# Patient Record
Sex: MALE | Race: WHITE | Employment: FULL TIME | URBAN - METROPOLITAN AREA
[De-identification: names, ages, dates, MRNs, and addresses within clinical notes are randomized per-mention and may not be internally consistent; named-entity substitution may affect disease eponyms.]

---

## 2018-10-08 ENCOUNTER — HOSPITAL ENCOUNTER (OUTPATIENT)
Age: 58
Discharge: HOME | End: 2018-10-08
Payer: COMMERCIAL

## 2018-10-08 PROCEDURE — 87305 ASPERGILLUS AG IA: CPT

## 2018-10-08 PROCEDURE — 87205 SMEAR GRAM STAIN: CPT

## 2018-10-08 PROCEDURE — 87385 HISTOPLASMA CAPSUL AG IA: CPT

## 2018-10-08 PROCEDURE — 87088 URINE BACTERIA CULTURE: CPT

## 2018-10-09 LAB — GRAM STAIN ORDERABLE: NORMAL

## 2018-10-10 LAB
ASPERGILLUS GALACTO AG: NEGATIVE
ASPERGILLUS GALACTO INDEX: 0.16
URINE CULTURE, ROUTINE: NORMAL

## 2018-10-15 LAB
Lab: NORMAL
REPORT: NORMAL
THIS TEST SENT TO: NORMAL

## 2018-10-29 ENCOUNTER — HOSPITAL ENCOUNTER (OUTPATIENT)
Age: 58
Discharge: HOME | End: 2018-10-29
Payer: COMMERCIAL

## 2018-10-29 ENCOUNTER — HOSPITAL ENCOUNTER (OUTPATIENT)
Dept: GENERAL RADIOLOGY | Age: 58
Discharge: HOME | End: 2018-10-31
Payer: COMMERCIAL

## 2018-10-29 DIAGNOSIS — R05.9 COUGH: ICD-10-CM

## 2018-10-29 PROCEDURE — 71046 X-RAY EXAM CHEST 2 VIEWS: CPT

## 2019-01-08 ENCOUNTER — HOSPITAL ENCOUNTER (OUTPATIENT)
Age: 59
Discharge: HOME | End: 2019-01-10

## 2019-01-08 PROCEDURE — 88342 IMHCHEM/IMCYTCHM 1ST ANTB: CPT

## 2019-01-08 PROCEDURE — 87081 CULTURE SCREEN ONLY: CPT

## 2019-01-08 PROCEDURE — 88305 TISSUE EXAM BY PATHOLOGIST: CPT

## 2019-01-09 LAB — CLOTEST: NORMAL

## 2019-07-30 ENCOUNTER — HOSPITAL ENCOUNTER (OUTPATIENT)
Age: 59
Discharge: HOME | End: 2019-08-01
Payer: COMMERCIAL

## 2019-07-30 LAB
CHOLESTEROL, TOTAL: 194 MG/DL (ref 0–199)
HDLC SERPL-MCNC: 47 MG/DL
LDL CHOLESTEROL CALCULATED: 103 MG/DL (ref 0–99)
TRIGL SERPL-MCNC: 222 MG/DL (ref 0–149)
VLDLC SERPL CALC-MCNC: 44 MG/DL

## 2019-07-30 PROCEDURE — 80061 LIPID PANEL: CPT

## 2020-05-06 ENCOUNTER — HOSPITAL ENCOUNTER (OUTPATIENT)
Age: 60
Discharge: HOME | End: 2020-05-08
Payer: COMMERCIAL

## 2020-05-06 PROCEDURE — U0003 INFECTIOUS AGENT DETECTION BY NUCLEIC ACID (DNA OR RNA); SEVERE ACUTE RESPIRATORY SYNDROME CORONAVIRUS 2 (SARS-COV-2) (CORONAVIRUS DISEASE [COVID-19]), AMPLIFIED PROBE TECHNIQUE, MAKING USE OF HIGH THROUGHPUT TECHNOLOGIES AS DESCRIBED BY CMS-2020-01-R: HCPCS

## 2020-05-08 LAB
SARS-COV-2: NOT DETECTED
SOURCE: NORMAL

## 2020-05-12 ENCOUNTER — HOSPITAL ENCOUNTER (OUTPATIENT)
Age: 60
Discharge: HOME | End: 2020-05-14
Payer: COMMERCIAL

## 2020-05-12 PROCEDURE — U0003 INFECTIOUS AGENT DETECTION BY NUCLEIC ACID (DNA OR RNA); SEVERE ACUTE RESPIRATORY SYNDROME CORONAVIRUS 2 (SARS-COV-2) (CORONAVIRUS DISEASE [COVID-19]), AMPLIFIED PROBE TECHNIQUE, MAKING USE OF HIGH THROUGHPUT TECHNOLOGIES AS DESCRIBED BY CMS-2020-01-R: HCPCS

## 2020-05-13 LAB
SARS-COV-2: NOT DETECTED
SOURCE: NORMAL

## 2020-09-10 ENCOUNTER — HOSPITAL ENCOUNTER (OUTPATIENT)
Age: 60
Discharge: HOME | End: 2020-09-12
Payer: COMMERCIAL

## 2020-09-10 PROCEDURE — 82306 VITAMIN D 25 HYDROXY: CPT

## 2020-09-11 LAB — VITAMIN D 25-HYDROXY: 79 NG/ML (ref 30–100)

## 2020-10-14 ENCOUNTER — OFFICE VISIT (OUTPATIENT)
Dept: VASCULAR SURGERY | Age: 60
End: 2020-10-14
Payer: COMMERCIAL

## 2020-10-14 VITALS
BODY MASS INDEX: 27.92 KG/M2 | RESPIRATION RATE: 16 BRPM | WEIGHT: 195 LBS | DIASTOLIC BLOOD PRESSURE: 82 MMHG | HEIGHT: 70 IN | SYSTOLIC BLOOD PRESSURE: 122 MMHG

## 2020-10-14 PROCEDURE — 99203 OFFICE O/P NEW LOW 30 MIN: CPT | Performed by: SURGERY

## 2020-10-14 NOTE — PROGRESS NOTES
Vascular Surgery Outpatient Consultation        Reason for Consult:    Chief Complaint   Patient presents with   [facility] Consultation     new pt. Raynauds       Requesting Physician:  No referring provider defined for this encounter. Chief Complaint   Patient presents with   [facility] Consultation     new pt. Raynauds       HISTORY OF PRESENT ILLNESS:                The patient is a 64 y.o. male who is referred for evaluation of bilateral upper extremity and bilateral lower extremity discoloration and cold sensation. He describes bilateral discoloration and some pain and discomfort with temperature changes. This is been happening intermittently. He describes when he is in the operating room he can get discoloration of the hands and they turn white in appearance. Today later then turned pink again. He has a history of multiple myeloma and is currently on maintenance therapy. He denies any chest pain, shortness of breath or discomfort. He otherwise is in his regular physical state of health. His only complaint is Raynaud's type of syndrome of the bilateral upper and bilateral lower extremities. All of which are exacerbated by temperature changes. He has no chronic rest pain of his upper or lower extremities. He has no ulcerations of his upper or lower extremities. Past Medical History:        Diagnosis Date    Compression fracture     History of blood transfusion     Hyperlipidemia     hisotry of    Multiple myeloma ([facility] [address]      Past Surgical History:        Procedure Laterality Date    ABDOMEN SURGERY      APPENDECTOMY  2011    BRONCHOSCOPY  12/1/2014    FIXATION KYPHOPLASTY      FRACTURE SURGERY      Vertebroplasty    LIPOMA RESECTION      OTHER SURGICAL HISTORY  12/21/2014    Bronchoscpy with washings    TONSILLECTOMY       Current Medications:   Prior to Admission medications    Medication Sig Start Date End Date Taking?  Authorizing Provider   immune globulin, human, (GAMMAGARD S/D) 5 g injection Infuse intravenously once   Yes Historical Provider, MD   Fluconazole (DIFLUCAN PO) Take by mouth   Yes Historical Provider, MD   Oseltamivir Phosphate (TAMIFLU PO) Take by mouth   Yes Historical Provider, MD   LevoFLOXacin (LEVAQUIN PO) Take by mouth   Yes Historical Provider, MD   AZITHROMYCIN PO Take by mouth   Yes Historical Provider, MD   acetaminophen 650 MG TABS Take 650 mg by mouth every 4 hours as needed 6/29/16  Yes DOMINICK Hernandez   albuterol sulfate HFA (PROAIR HFA) 108 (90 BASE) MCG/ACT inhaler Inhale 2 puffs into the lungs every 6 hours as needed for Wheezing or Shortness of Breath 6/29/16  Yes DOMINICK Hernandez   dapsone 100 MG tablet Take 100 mg by mouth every other day Indications: monday, wednesday, friday   Yes Historical Provider, MD   atorvastatin (LIPITOR) 40 MG tablet Take 1 tablet by mouth daily .,. 6/20/16  Yes Yu Marcus DO   carfilzomib (KYPROLIS) 60 MG chemo syringe Infuse 20 mg/m2 intravenously once   Yes Historical Provider, MD   cyclobenzaprine (FLEXERIL) 10 MG tablet Take 10 mg by mouth as needed for Muscle spasms   Yes Historical Provider, MD   diazepam (VALIUM) 5 MG tablet Take 5 mg by mouth every 6 hours as needed for Anxiety   Yes Historical Provider, MD   Zoledronic Acid (ZOMETA IV) Infuse 4 mg intravenously    Yes Historical Provider, MD   LORazepam (ATIVAN PO) Take by mouth   Yes Historical Provider, MD   oxyCODONE (ROXICODONE) 5 MG immediate release tablet Take 5 mg by mouth every 4 hours as needed for Pain   Yes Historical Provider, MD   lenalidomide (REVLIMID) 5 MG chemo capsule Take 15 mg by mouth daily    Yes Historical Provider, MD   promethazine (PHENERGAN) 25 MG tablet   Take 25 mg by mouth 2 times daily    Yes Historical Provider, MD   aspirin 81 MG tablet Take 81 mg by mouth daily   Yes Historical Provider, MD   Cholecalciferol (VITAMIN D) 2000 UNITS CAPS capsule Take 50,000 Units by mouth once a week Indications: on mondays  1/3/15  Yes Swallowing problem:  No [x]/Yes []               Nose bleeds:   No [x]/Yes []      Voice hoarseness:  No [x]/Yes []  Respiratory:             Cough:   No [x]/Yes []      Pleuritic chest pain:  No [x]/Yes []                        Dyspnea:   No [x]/Yes []      Wheezing:   No [x]/Yes []  Cardiovascular:             Angina:   No [x]/Yes []      Palpitations:   No [x]/Yes []          Claudication:    No [x]/Yes []      Leg swelling:   No [x]/Yes []  Gastrointestinal:             Nausea or vomiting:  No [x]/Yes []               Abdominal pain:  No [x]/Yes []                     Intestinal bleeding: No [x]/Yes []  Musculoskeletal:             Leg pain:   No [x]/Yes []      Back pain:   No []/Yes [x]                    Weakness:   No [x]/Yes []  Neurologic:             Numbness:   No [x]/Yes []      Paralysis:   No [x]/Yes []                       Headaches:   No [x]/Yes []  Hematologic, lymphatic:   Anemia:   No [x]/Yes []              Bleeding or bruising:  No [x]/Yes []              Fevers or chills: No [x]/Yes []  Endocrine:             Temp intolerance:   No [x]/Yes []                       Polydipsia, polyuria:  No [x]/Yes []  Skin:              Rash:    No [x]/Yes []      Ulcers:   No [x]/Yes []              Abnorm pigment: No [x]/Yes []  :              Frequency/urgency:  No [x]/Yes []      Hematuria:    No [x]/Yes []                      Incontinence:    No [x]/Yes []    See history of present illness    PHYSICAL EXAM:  Vitals:    10/14/20 0809   BP: 122/82   Resp: 16     General Appearance: alert and oriented to person, place and time, well developed and well- nourished, in no acute distress  Skin: warm and dry, no rash or erythema  Head: normocephalic and atraumatic  Eyes: extraocular eye movements intact, conjunctivae normal  ENT: external ear and ear canal normal bilaterally, nose without deformity  Pulmonary/Chest: clear to auscultation bilaterally- no wheezes, rales or rhonchi, normal air movement, no respiratory distress  Cardiovascular: normal rate, regular rhythm, normal S1 and S2, no murmurs, no carotid bruits  Abdomen: soft, non-tender, non-distended, normal bowel sounds, no masses or organomegaly  Musculoskeletal: normal range of motion, no joint swelling, deformity or tenderness  Neurologic: no cranial nerve deficit, gait, coordination and speech normal  Extremities: no leg edema bilaterally    PULSE EXAM      Right      Left   Brachial 3 3   Radial 3 3   Femoral     Popliteal     Dorsalis Pedis 3 3   Posterior Tibial 3 3   (3=normal, 2=diminished, 1=barely palpable, 4=widened)      Problem List Items Addressed This Visit     Raynaud's syndrome (Chronic)            #1 Raynaud's syndrome/phenomenon. At this point he is doing very well. He has no rest pain and he has no wounds of his upper or lower extremities. I would assume based on his prior history of chemotherapy. This may be a latent affect. We did try him on some amlodipine to see if this resolves some of his symptoms. He will call if there is any questions or concerns or any issues. We will see him back in 3 months      No follow-ups on file.

## 2020-10-19 PROBLEM — I73.00 RAYNAUD'S SYNDROME: Chronic | Status: ACTIVE | Noted: 2020-10-19

## 2020-10-21 RX ORDER — AMLODIPINE BESYLATE 5 MG/1
5 TABLET ORAL DAILY
Qty: 30 TABLET | Refills: 3 | Status: SHIPPED
Start: 2020-10-21 | End: 2020-10-26

## 2020-10-23 RX ORDER — AMLODIPINE BESYLATE 5 MG/1
5 TABLET ORAL DAILY
Qty: 30 TABLET | Refills: 3 | Status: SHIPPED
Start: 2020-10-23 | End: 2020-10-26

## 2020-10-26 RX ORDER — AMLODIPINE BESYLATE 5 MG/1
TABLET ORAL
Qty: 90 TABLET | Refills: 0 | Status: SHIPPED
Start: 2020-10-26 | End: 2021-06-07

## 2021-01-12 ENCOUNTER — TELEPHONE (OUTPATIENT)
Dept: VASCULAR SURGERY | Age: 61
End: 2021-01-12

## 2021-02-02 ENCOUNTER — TELEPHONE (OUTPATIENT)
Dept: VASCULAR SURGERY | Age: 61
End: 2021-02-02

## 2021-02-03 ENCOUNTER — OFFICE VISIT (OUTPATIENT)
Dept: VASCULAR SURGERY | Age: 61
End: 2021-02-03
Payer: COMMERCIAL

## 2021-02-03 VITALS
HEIGHT: 70 IN | WEIGHT: 193 LBS | DIASTOLIC BLOOD PRESSURE: 70 MMHG | BODY MASS INDEX: 27.63 KG/M2 | SYSTOLIC BLOOD PRESSURE: 120 MMHG

## 2021-02-03 DIAGNOSIS — I73.00 RAYNAUD'S PHENOMENON WITHOUT GANGRENE: Primary | ICD-10-CM

## 2021-02-03 DIAGNOSIS — I73.00 RAYNAUD'S DISEASE WITHOUT GANGRENE: Chronic | ICD-10-CM

## 2021-02-03 PROCEDURE — 99213 OFFICE O/P EST LOW 20 MIN: CPT | Performed by: SURGERY

## 2021-03-01 NOTE — PROGRESS NOTES
Vascular Surgery Outpatient Progress Note      Chief Complaint   Patient presents with    Follow-up     raynaud       HISTORY OF PRESENT ILLNESS:                The patient is a 64 y.o. male who returns for follow-up evaluation of with a history of upper extremity and lower extremity coldness and discoloration. This patient is well-known to the service. He has a history of multiple myeloma. He is completed his course of initial therapy. He is on maintenance therapy. At this point he is really gotten no relief from the amlodipine and intermittently gets lightheaded with the medication. He denies any active chest pain or shortness of breath he denies any tissue loss of his lower extremities or wounds. He otherwise is doing well. Past Medical History:        Diagnosis Date    Compression fracture     History of blood transfusion     Hyperlipidemia     hisotry of    Multiple myeloma ([facility] [address]      Past Surgical History:        Procedure Laterality Date    ABDOMEN SURGERY      APPENDECTOMY  2011    BRONCHOSCOPY  12/1/2014    FIXATION KYPHOPLASTY      FRACTURE SURGERY      Vertebroplasty    LIPOMA RESECTION      OTHER SURGICAL HISTORY  12/21/2014    Bronchoscpy with washings    TONSILLECTOMY       Current Medications:   Prior to Admission medications    Medication Sig Start Date End Date Taking?  Authorizing Provider   amLODIPine (NORVASC) 5 MG tablet TAKE 1 TABLET BY MOUTH EVERY DAY 10/26/20  Yes Myra Young MD   immune globulin, human, (GAMMAGARD S/D) 5 g injection Infuse intravenously once   Yes Historical Provider, MD   Fluconazole (DIFLUCAN PO) Take by mouth   Yes Historical Provider, MD   Oseltamivir Phosphate (TAMIFLU PO) Take by mouth   Yes Historical Provider, MD   LevoFLOXacin (LEVAQUIN PO) Take by mouth   Yes Historical Provider, MD   AZITHROMYCIN PO Take by mouth   Yes Historical Provider, MD   acetaminophen 650 MG TABS Take 650 mg by mouth every 4 hours as needed 6/29/16  Yes Raciel Pham DOMINICK Ramos   albuterol sulfate HFA (PROAIR HFA) 108 (90 BASE) MCG/ACT inhaler Inhale 2 puffs into the lungs every 6 hours as needed for Wheezing or Shortness of Breath 6/29/16  Yes DOMINICK Monahan   dapsone 100 MG tablet Take 100 mg by mouth every other day Indications: monday, wednesday, friday   Yes Historical Provider, MD   atorvastatin (LIPITOR) 40 MG tablet Take 1 tablet by mouth daily .,. 6/20/16  Yes Asia Tyson,    carfilzomib (KYPROLIS) 60 MG chemo syringe Infuse 20 mg/m2 intravenously once   Yes Historical Provider, MD   cyclobenzaprine (FLEXERIL) 10 MG tablet Take 10 mg by mouth as needed for Muscle spasms   Yes Historical Provider, MD   diazepam (VALIUM) 5 MG tablet Take 5 mg by mouth every 6 hours as needed for Anxiety   Yes Historical Provider, MD   Zoledronic Acid (ZOMETA IV) Infuse 4 mg intravenously    Yes Historical Provider, MD   LORazepam (ATIVAN PO) Take by mouth   Yes Historical Provider, MD   oxyCODONE (ROXICODONE) 5 MG immediate release tablet Take 5 mg by mouth every 4 hours as needed for Pain   Yes Historical Provider, MD   lenalidomide (REVLIMID) 5 MG chemo capsule Take 15 mg by mouth daily    Yes Historical Provider, MD   promethazine (PHENERGAN) 25 MG tablet   Take 25 mg by mouth 2 times daily    Yes Historical Provider, MD   aspirin 81 MG tablet Take 81 mg by mouth daily   Yes Historical Provider, MD   Cholecalciferol (VITAMIN D) 2000 UNITS CAPS capsule Take 50,000 Units by mouth once a week Indications: on mondays  1/3/15  Yes Historical Provider, MD   acyclovir (ZOVIRAX) 200 MG capsule   Take 400 mg by mouth 2 times daily Taking for prophylaxis w chemotherapy   Yes Historical Provider, MD     Allergies:  Patient has no known allergies.     Social History     Socioeconomic History    Marital status:      Spouse name: Not on file    Number of children: Not on file    Years of education: Not on file    Highest education level: Not on file   Occupational History    Not on file   Social Needs    Financial resource strain: Not on file    Food insecurity     Worry: Not on file     Inability: Not on file    Transportation needs     Medical: Not on file     Non-medical: Not on file   Tobacco Use    Smoking status: Never Smoker    Smokeless tobacco: Never Used   Substance and Sexual Activity    Alcohol use: Yes     Comment: social    Drug use: No    Sexual activity: Yes     Partners: Female   Lifestyle    Physical activity     Days per week: Not on file     Minutes per session: Not on file    Stress: Not on file   Relationships    Social connections     Talks on phone: Not on file     Gets together: Not on file     Attends Restorationism service: Not on file     Active member of club or organization: Not on file     Attends meetings of clubs or organizations: Not on file     Relationship status: Not on file    Intimate partner violence     Fear of current or ex partner: Not on file     Emotionally abused: Not on file     Physically abused: Not on file     Forced sexual activity: Not on file   Other Topics Concern    Not on file   Social History Narrative    Not on file        Family History   Problem Relation Age of Onset    Cancer Father         Prostate       REVIEW OF SYSTEMS:    Constitutional: Negative for activity change, appetite change, chills, diaphoresis, fatigue, fever and unexpected weight change. View of systems have been performed.   Otherwise negative except for the above    PHYSICAL EXAM:  Vitals:    02/03/21 0820   BP: 120/70     General Appearance: alert and oriented to person, place and time, well developed and well- nourished, in no acute distress  Skin: warm and dry, no rash or erythema today on physical examination his extremities are unremarkable  Head: normocephalic and atraumatic  Eyes: extraocular eye movements intact, conjunctivae normal  ENT: external ear and ear canal normal bilaterally, nose without deformity  Pulmonary/Chest: clear to auscultation bilaterally- no wheezes, rales or rhonchi, normal air movement, no respiratory distress  Cardiovascular: normal rate, regular rhythm, normal S1 and S2, no murmurs, no carotid bruits  Abdomen: soft, non-tender, non-distended, normal bowel sounds, no masses or organomegaly  Musculoskeletal: normal range of motion, no joint swelling, deformity or tenderness  Neurologic: no cranial nerve deficit, gait, coordination and speech normal  Extremities: no leg edema bilaterally  Bilateral palpable brachial radial pulses bilateral palpable DP and PT is all symmetric at 3+. No signs of ulceration. Problem List Items Addressed This Visit     Raynaud's syndrome (Chronic)      Other Visit Diagnoses     Raynaud's phenomenon without gangrene    -  Primary          #1 possible Raynaud's syndrome. However most likely this is secondary to his chemotherapy and most likely a sequelae from that. He did not respond very well to calcium channel blockers therefore I do not see any reason to continue that. Unfortunately we will treat this symptomatically. He otherwise is doing very well and has made a remarkable recovery from his multiple myeloma. We will see him on an as-needed basis he can call for any questions or concerns or any issues. No follow-ups on file.

## 2021-06-07 RX ORDER — AMLODIPINE BESYLATE 5 MG/1
TABLET ORAL
Qty: 90 TABLET | Refills: 0 | Status: SHIPPED
Start: 2021-06-07 | End: 2021-10-25

## 2021-06-25 ENCOUNTER — HOSPITAL ENCOUNTER (OUTPATIENT)
Age: 61
Discharge: HOME | End: 2021-06-27

## 2021-06-25 PROCEDURE — 88305 TISSUE EXAM BY PATHOLOGIST: CPT

## 2021-08-16 ENCOUNTER — HOSPITAL ENCOUNTER (OUTPATIENT)
Dept: GENERAL RADIOLOGY | Age: 61
Discharge: HOME | End: 2021-08-18
Payer: COMMERCIAL

## 2021-08-16 DIAGNOSIS — M53.3 SACROCOCCYGEAL DISORDERS, NOT ELSEWHERE CLASSIFIED: ICD-10-CM

## 2021-08-16 PROCEDURE — 72220 X-RAY EXAM SACRUM TAILBONE: CPT

## 2021-10-25 ENCOUNTER — APPOINTMENT (OUTPATIENT)
Dept: GENERAL RADIOLOGY | Age: 61
End: 2021-10-25
Payer: COMMERCIAL

## 2021-10-25 ENCOUNTER — HOSPITAL ENCOUNTER (EMERGENCY)
Age: 61
Discharge: HOME | End: 2021-10-25
Attending: EMERGENCY MEDICINE
Payer: COMMERCIAL

## 2021-10-25 VITALS
SYSTOLIC BLOOD PRESSURE: 154 MMHG | BODY MASS INDEX: 27.02 KG/M2 | RESPIRATION RATE: 16 BRPM | HEART RATE: 70 BPM | HEIGHT: 71 IN | DIASTOLIC BLOOD PRESSURE: 78 MMHG | WEIGHT: 193 LBS | OXYGEN SATURATION: 96 % | TEMPERATURE: 98.2 F

## 2021-10-25 DIAGNOSIS — R07.9 CHEST PAIN, UNSPECIFIED TYPE: Primary | ICD-10-CM

## 2021-10-25 LAB
ALBUMIN SERPL-MCNC: 4.7 G/DL (ref 3.5–5.2)
ALP BLD-CCNC: 69 U/L (ref 40–129)
ALT SERPL-CCNC: 23 U/L (ref 0–40)
ANION GAP SERPL CALCULATED.3IONS-SCNC: 12 MMOL/L (ref 7–16)
AST SERPL-CCNC: 23 U/L (ref 0–39)
BASOPHILS ABSOLUTE: 0.03 E9/L (ref 0–0.2)
BASOPHILS RELATIVE PERCENT: 0.6 % (ref 0–2)
BILIRUB SERPL-MCNC: 1.1 MG/DL (ref 0–1.2)
BUN BLDV-MCNC: 21 MG/DL (ref 6–20)
CALCIUM SERPL-MCNC: 9.6 MG/DL (ref 8.6–10.2)
CHLORIDE BLD-SCNC: 99 MMOL/L (ref 98–107)
CO2: 25 MMOL/L (ref 22–29)
CREAT SERPL-MCNC: 1 MG/DL (ref 0.7–1.2)
D DIMER: <200 NG/ML DDU
EKG ATRIAL RATE: 74 BPM
EKG P AXIS: 60 DEGREES
EKG P-R INTERVAL: 182 MS
EKG Q-T INTERVAL: 402 MS
EKG QRS DURATION: 106 MS
EKG QTC CALCULATION (BAZETT): 446 MS
EKG R AXIS: 15 DEGREES
EKG T AXIS: 7 DEGREES
EKG VENTRICULAR RATE: 74 BPM
EOSINOPHILS ABSOLUTE: 0.05 E9/L (ref 0.05–0.5)
EOSINOPHILS RELATIVE PERCENT: 1 % (ref 0–6)
GFR AFRICAN AMERICAN: >60
GFR NON-AFRICAN AMERICAN: >60 ML/MIN/1.73
GLUCOSE BLD-MCNC: 115 MG/DL (ref 74–99)
HCT VFR BLD CALC: 42.1 % (ref 37–54)
HEMOGLOBIN: 14.5 G/DL (ref 12.5–16.5)
IMMATURE GRANULOCYTES #: 0.03 E9/L
IMMATURE GRANULOCYTES %: 0.6 % (ref 0–5)
LYMPHOCYTES ABSOLUTE: 1.2 E9/L (ref 1.5–4)
LYMPHOCYTES RELATIVE PERCENT: 24 % (ref 20–42)
MCH RBC QN AUTO: 31.2 PG (ref 26–35)
MCHC RBC AUTO-ENTMCNC: 34.4 % (ref 32–34.5)
MCV RBC AUTO: 90.5 FL (ref 80–99.9)
MONOCYTES ABSOLUTE: 0.5 E9/L (ref 0.1–0.95)
MONOCYTES RELATIVE PERCENT: 10 % (ref 2–12)
NEUTROPHILS ABSOLUTE: 3.18 E9/L (ref 1.8–7.3)
NEUTROPHILS RELATIVE PERCENT: 63.8 % (ref 43–80)
PDW BLD-RTO: 12.6 FL (ref 11.5–15)
PLATELET # BLD: 151 E9/L (ref 130–450)
PMV BLD AUTO: 10.4 FL (ref 7–12)
POTASSIUM REFLEX MAGNESIUM: 4.2 MMOL/L (ref 3.5–5)
PRO-BNP: 14 PG/ML (ref 0–125)
RBC # BLD: 4.65 E12/L (ref 3.8–5.8)
SARS-COV-2, NAAT: NOT DETECTED
SODIUM BLD-SCNC: 136 MMOL/L (ref 132–146)
TOTAL PROTEIN: 7.2 G/DL (ref 6.4–8.3)
TROPONIN, HIGH SENSITIVITY: 8 NG/L (ref 0–11)
TROPONIN, HIGH SENSITIVITY: 9 NG/L (ref 0–11)
WBC # BLD: 5 E9/L (ref 4.5–11.5)

## 2021-10-25 PROCEDURE — 80053 COMPREHEN METABOLIC PANEL: CPT

## 2021-10-25 PROCEDURE — 85378 FIBRIN DEGRADE SEMIQUANT: CPT

## 2021-10-25 PROCEDURE — 83880 ASSAY OF NATRIURETIC PEPTIDE: CPT

## 2021-10-25 PROCEDURE — 71045 X-RAY EXAM CHEST 1 VIEW: CPT

## 2021-10-25 PROCEDURE — 99284 EMERGENCY DEPT VISIT MOD MDM: CPT

## 2021-10-25 PROCEDURE — 93005 ELECTROCARDIOGRAM TRACING: CPT | Performed by: STUDENT IN AN ORGANIZED HEALTH CARE EDUCATION/TRAINING PROGRAM

## 2021-10-25 PROCEDURE — 87635 SARS-COV-2 COVID-19 AMP PRB: CPT

## 2021-10-25 PROCEDURE — 85025 COMPLETE CBC W/AUTO DIFF WBC: CPT

## 2021-10-25 PROCEDURE — 84484 ASSAY OF TROPONIN QUANT: CPT

## 2021-10-25 ASSESSMENT — ENCOUNTER SYMPTOMS
SINUS PRESSURE: 0
SHORTNESS OF BREATH: 1
EYE DISCHARGE: 0
SORE THROAT: 0
ABDOMINAL PAIN: 0
VOMITING: 0
COUGH: 0
DIARRHEA: 0
NAUSEA: 1
EYE REDNESS: 0
BACK PAIN: 0
EYE PAIN: 0
WHEEZING: 0

## 2021-10-25 ASSESSMENT — PAIN SCALES - GENERAL: PAINLEVEL_OUTOF10: 2

## 2021-10-25 ASSESSMENT — PAIN DESCRIPTION - PAIN TYPE: TYPE: ACUTE PAIN

## 2021-10-25 ASSESSMENT — PAIN DESCRIPTION - LOCATION: LOCATION: CHEST

## 2021-10-25 ASSESSMENT — PAIN DESCRIPTION - ORIENTATION: ORIENTATION: MID

## 2021-10-25 ASSESSMENT — PAIN DESCRIPTION - DESCRIPTORS: DESCRIPTORS: HEAVINESS

## 2021-10-25 ASSESSMENT — PAIN DESCRIPTION - FREQUENCY: FREQUENCY: CONTINUOUS

## 2021-10-25 NOTE — ED PROVIDER NOTES
Patient presents with chest pain and shortness of breath. Patient states that the chest pain is substernal and mild in nature. He states that he has had intermittent episodes for the past 2 weeks however today's was worse in addition to including nausea and so he presents today. Patient states that he was on multiple myeloma patient and was treated with chemotherapy 2 years ago pain as last treatment. He states that during that time he did have a decreased ejection fraction at approximately 30% which he is also concerned about. Patient states that his nausea has begun to improve however chest pain remains. He did not do anything to make his symptoms better nor worse. Patient states that he does take baby aspirin daily. He denies any syncope, cough, vomiting, abdominal pain, bowel changes, lower extremity swelling or pain. He does follow with a cardiologist.  He is a never smoker and is vaccinated against Covid. The history is provided by the patient. No  was used. Review of Systems   Constitutional: Negative for chills and fever. HENT: Negative for ear pain, sinus pressure and sore throat. Eyes: Negative for pain, discharge and redness. Respiratory: Positive for shortness of breath. Negative for cough and wheezing. Cardiovascular: Positive for chest pain and palpitations. Gastrointestinal: Positive for nausea. Negative for abdominal pain, diarrhea and vomiting. Genitourinary: Negative for dysuria and frequency. Musculoskeletal: Negative for arthralgias and back pain. Skin: Negative for rash and wound. Neurological: Positive for light-headedness. Negative for weakness and headaches. Hematological: Negative for adenopathy. All other systems reviewed and are negative. Physical Exam  Vitals and nursing note reviewed. Constitutional:       General: He is not in acute distress. Appearance: He is well-developed.    HENT:      Head: Normocephalic and atraumatic. Eyes:      Conjunctiva/sclera: Conjunctivae normal.   Cardiovascular:      Rate and Rhythm: Normal rate and regular rhythm. Heart sounds: Normal heart sounds. No murmur heard. Pulmonary:      Effort: Pulmonary effort is normal. No respiratory distress. Breath sounds: Normal breath sounds. No wheezing or rales. Abdominal:      General: Bowel sounds are normal.      Palpations: Abdomen is soft. Tenderness: There is no abdominal tenderness. There is no guarding or rebound. Musculoskeletal:         General: No tenderness or deformity. Cervical back: Normal range of motion and neck supple. Right lower leg: No tenderness. No edema. Left lower leg: No tenderness. No edema. Skin:     General: Skin is warm and dry. Neurological:      Mental Status: He is alert and oriented to person, place, and time. Cranial Nerves: No cranial nerve deficit. Coordination: Coordination normal.          Procedures     MDM  Number of Diagnoses or Management Options  Chest pain, unspecified type  Diagnosis management comments: Patient presents with chest pain or shortness of breath. EKG did not show any obvious signs of ischemia. CBC was unremarkable. CMP was also unremarkable. Initial troponin was 9. Repeat was 8. BNP was within normal limits. D-dimer was negative. Chest x-ray not show any acute cardiopulmonary processes. Covid was negative. At this point does not appear to be any emergent process ongoing. Patient was informed the results and plan is agreeable. Patient be discharged with instructions to follow-up with his PCP and cardiologist for further evaluation care. Patient discharged home. Amount and/or Complexity of Data Reviewed  Clinical lab tests: reviewed  Tests in the radiology section of CPT®: reviewed  Tests in the medicine section of CPT®: reviewed         ED Course as of Oct 25 1617   Mon Oct 25, 2021   0895 EKG:   This EKG is signed and allergies.     -------------------------------------------------- RESULTS -------------------------------------------------  Labs:  Results for orders placed or performed during the hospital encounter of 10/25/21   COVID-19, Rapid    Specimen: Nasopharyngeal Swab   Result Value Ref Range    SARS-CoV-2, NAAT Not Detected Not Detected   CBC Auto Differential   Result Value Ref Range    WBC 5.0 4.5 - 11.5 E9/L    RBC 4.65 3.80 - 5.80 E12/L    Hemoglobin 14.5 12.5 - 16.5 g/dL    Hematocrit 42.1 37.0 - 54.0 %    MCV 90.5 80.0 - 99.9 fL    MCH 31.2 26.0 - 35.0 pg    MCHC 34.4 32.0 - 34.5 %    RDW 12.6 11.5 - 15.0 fL    Platelets 919 447 - 807 E9/L    MPV 10.4 7.0 - 12.0 fL    Neutrophils % 63.8 43.0 - 80.0 %    Immature Granulocytes % 0.6 0.0 - 5.0 %    Lymphocytes % 24.0 20.0 - 42.0 %    Monocytes % 10.0 2.0 - 12.0 %    Eosinophils % 1.0 0.0 - 6.0 %    Basophils % 0.6 0.0 - 2.0 %    Neutrophils Absolute 3.18 1.80 - 7.30 E9/L    Immature Granulocytes # 0.03 E9/L    Lymphocytes Absolute 1.20 (L) 1.50 - 4.00 E9/L    Monocytes Absolute 0.50 0.10 - 0.95 E9/L    Eosinophils Absolute 0.05 0.05 - 0.50 E9/L    Basophils Absolute 0.03 0.00 - 0.20 E9/L   Comprehensive Metabolic Panel w/ Reflex to MG   Result Value Ref Range    Sodium 136 132 - 146 mmol/L    Potassium reflex Magnesium 4.2 3.5 - 5.0 mmol/L    Chloride 99 98 - 107 mmol/L    CO2 25 22 - 29 mmol/L    Anion Gap 12 7 - 16 mmol/L    Glucose 115 (H) 74 - 99 mg/dL    BUN 21 (H) 6 - 20 mg/dL    CREATININE 1.0 0.7 - 1.2 mg/dL    GFR Non-African American >60 >=60 mL/min/1.73    GFR African American >60     Calcium 9.6 8.6 - 10.2 mg/dL    Total Protein 7.2 6.4 - 8.3 g/dL    Albumin 4.7 3.5 - 5.2 g/dL    Total Bilirubin 1.1 0.0 - 1.2 mg/dL    Alkaline Phosphatase 69 40 - 129 U/L    ALT 23 0 - 40 U/L    AST 23 0 - 39 U/L   Troponin   Result Value Ref Range    Troponin, High Sensitivity 9 0 - 11 ng/L   Brain Natriuretic Peptide   Result Value Ref Range    Pro-BNP 14 0 - 125 pg/mL D-Dimer, Quantitative   Result Value Ref Range    D-Dimer, Quant <200 ng/mL DDU   Troponin   Result Value Ref Range    Troponin, High Sensitivity 8 0 - 11 ng/L   EKG 12 Lead   Result Value Ref Range    Ventricular Rate 74 BPM    Atrial Rate 74 BPM    P-R Interval 182 ms    QRS Duration 106 ms    Q-T Interval 402 ms    QTc Calculation (Bazett) 446 ms    P Axis 60 degrees    R Axis 15 degrees    T Axis 7 degrees       Radiology:  XR CHEST PORTABLE   Final Result   No acute process. ------------------------- NURSING NOTES AND VITALS REVIEWED ---------------------------  Date / Time Roomed:  10/25/2021  8:31 AM  ED Bed Assignment:  11/11    The nursing notes within the ED encounter and vital signs as below have been reviewed. BP (!) 154/78   Pulse 70   Temp 98.2 °F (36.8 °C) (Oral)   Resp 16   Ht 5' 11\" (1.803 m)   Wt 193 lb (87.5 kg)   SpO2 96%   BMI 26.92 kg/m²   Oxygen Saturation Interpretation: Normal      ------------------------------------------ PROGRESS NOTES ------------------------------------------  4:17 PM EDT  I have spoken with the patient and discussed todays results, in addition to providing specific details for the plan of care and counseling regarding the diagnosis and prognosis. Their questions are answered at this time and they are agreeable with the plan. I discussed at length with them reasons for immediate return here for re evaluation. They will followup with their cardiologist and primary care physician by calling their office tomorrow. --------------------------------- ADDITIONAL PROVIDER NOTES ---------------------------------  At this time the patient is without objective evidence of an acute process requiring hospitalization or inpatient management. They have remained hemodynamically stable throughout their entire ED visit and are stable for discharge with outpatient follow-up.      The plan has been discussed in detail and they are aware of the specific conditions for emergent return, as well as the importance of follow-up. Discharge Medication List as of 10/25/2021 11:50 AM          Diagnosis:  1. Chest pain, unspecified type        Disposition:  Patient's disposition: Discharge to home  Patient's condition is stable. Patient was seen and evaluated by both myself and Mika Rosenthal DO.          Luis Sorto DO  Resident  10/25/21 5727

## 2021-11-29 ENCOUNTER — HOSPITAL ENCOUNTER (OUTPATIENT)
Dept: GENERAL RADIOLOGY | Age: 61
Discharge: HOME | End: 2021-12-01
Payer: COMMERCIAL

## 2021-11-29 ENCOUNTER — HOSPITAL ENCOUNTER (OUTPATIENT)
Age: 61
Discharge: HOME | End: 2021-12-01
Payer: COMMERCIAL

## 2021-11-29 DIAGNOSIS — R05.3 CHRONIC COUGH: ICD-10-CM

## 2021-11-29 PROCEDURE — 71046 X-RAY EXAM CHEST 2 VIEWS: CPT

## 2022-11-16 ENCOUNTER — APPOINTMENT (OUTPATIENT)
Dept: CT IMAGING | Age: 62
End: 2022-11-16

## 2022-11-16 ENCOUNTER — APPOINTMENT (OUTPATIENT)
Dept: GENERAL RADIOLOGY | Age: 62
End: 2022-11-16

## 2022-11-16 ENCOUNTER — HOSPITAL ENCOUNTER (EMERGENCY)
Age: 62
Discharge: ANOTHER HOSPITAL | End: 2022-11-16
Attending: EMERGENCY MEDICINE

## 2022-11-16 VITALS
DIASTOLIC BLOOD PRESSURE: 72 MMHG | WEIGHT: 190 LBS | OXYGEN SATURATION: 100 % | HEART RATE: 115 BPM | BODY MASS INDEX: 27.2 KG/M2 | TEMPERATURE: 97.5 F | SYSTOLIC BLOOD PRESSURE: 124 MMHG | HEIGHT: 70 IN | RESPIRATION RATE: 31 BRPM

## 2022-11-16 DIAGNOSIS — I60.9 SPONTANEOUS SUBARACHNOID HEMORRHAGE (HCC): Primary | ICD-10-CM

## 2022-11-16 LAB
ALBUMIN SERPL-MCNC: 4.6 G/DL (ref 3.5–5.2)
ALP BLD-CCNC: 57 U/L (ref 40–129)
ALT SERPL-CCNC: 22 U/L (ref 0–40)
ANION GAP SERPL CALCULATED.3IONS-SCNC: 15 MMOL/L (ref 7–16)
APTT: 25.3 SEC (ref 24.5–35.1)
AST SERPL-CCNC: 23 U/L (ref 0–39)
BASOPHILS ABSOLUTE: 0.03 E9/L (ref 0–0.2)
BASOPHILS RELATIVE PERCENT: 0.4 % (ref 0–2)
BILIRUB SERPL-MCNC: 0.9 MG/DL (ref 0–1.2)
BUN BLDV-MCNC: 22 MG/DL (ref 6–23)
CALCIUM SERPL-MCNC: 9.6 MG/DL (ref 8.6–10.2)
CHLORIDE BLD-SCNC: 100 MMOL/L (ref 98–107)
CO2: 25 MMOL/L (ref 22–29)
CREAT SERPL-MCNC: 1.2 MG/DL (ref 0.7–1.2)
EKG ATRIAL RATE: 68 BPM
EKG P AXIS: 59 DEGREES
EKG P-R INTERVAL: 226 MS
EKG Q-T INTERVAL: 428 MS
EKG QRS DURATION: 106 MS
EKG QTC CALCULATION (BAZETT): 455 MS
EKG R AXIS: -57 DEGREES
EKG T AXIS: 45 DEGREES
EKG VENTRICULAR RATE: 68 BPM
EOSINOPHILS ABSOLUTE: 0.03 E9/L (ref 0.05–0.5)
EOSINOPHILS RELATIVE PERCENT: 0.4 % (ref 0–6)
GFR SERPL CREATININE-BSD FRML MDRD: >60 ML/MIN/1.73
GLUCOSE BLD-MCNC: 181 MG/DL (ref 74–99)
HCT VFR BLD CALC: 40.9 % (ref 37–54)
HEMOGLOBIN: 13.9 G/DL (ref 12.5–16.5)
IMMATURE GRANULOCYTES #: 0.02 E9/L
IMMATURE GRANULOCYTES %: 0.3 % (ref 0–5)
INR BLD: 1.1
LYMPHOCYTES ABSOLUTE: 1.15 E9/L (ref 1.5–4)
LYMPHOCYTES RELATIVE PERCENT: 15.4 % (ref 20–42)
MCH RBC QN AUTO: 31.2 PG (ref 26–35)
MCHC RBC AUTO-ENTMCNC: 34 % (ref 32–34.5)
MCV RBC AUTO: 91.9 FL (ref 80–99.9)
MONOCYTES ABSOLUTE: 0.57 E9/L (ref 0.1–0.95)
MONOCYTES RELATIVE PERCENT: 7.6 % (ref 2–12)
NEUTROPHILS ABSOLUTE: 5.69 E9/L (ref 1.8–7.3)
NEUTROPHILS RELATIVE PERCENT: 75.9 % (ref 43–80)
PDW BLD-RTO: 12.6 FL (ref 11.5–15)
PLATELET # BLD: 150 E9/L (ref 130–450)
PMV BLD AUTO: 10.9 FL (ref 7–12)
POTASSIUM SERPL-SCNC: 3.5 MMOL/L (ref 3.5–5)
PROTHROMBIN TIME: 11.5 SEC (ref 9.3–12.4)
RBC # BLD: 4.45 E12/L (ref 3.8–5.8)
SODIUM BLD-SCNC: 140 MMOL/L (ref 132–146)
TOTAL PROTEIN: 6.9 G/DL (ref 6.4–8.3)
TROPONIN, HIGH SENSITIVITY: 9 NG/L (ref 0–11)
WBC # BLD: 7.5 E9/L (ref 4.5–11.5)

## 2022-11-16 PROCEDURE — 96374 THER/PROPH/DIAG INJ IV PUSH: CPT

## 2022-11-16 PROCEDURE — 85610 PROTHROMBIN TIME: CPT

## 2022-11-16 PROCEDURE — 96365 THER/PROPH/DIAG IV INF INIT: CPT

## 2022-11-16 PROCEDURE — 93005 ELECTROCARDIOGRAM TRACING: CPT | Performed by: EMERGENCY MEDICINE

## 2022-11-16 PROCEDURE — 6360000002 HC RX W HCPCS: Performed by: EMERGENCY MEDICINE

## 2022-11-16 PROCEDURE — 31500 INSERT EMERGENCY AIRWAY: CPT

## 2022-11-16 PROCEDURE — 2580000003 HC RX 258: Performed by: EMERGENCY MEDICINE

## 2022-11-16 PROCEDURE — 85730 THROMBOPLASTIN TIME PARTIAL: CPT

## 2022-11-16 PROCEDURE — 2500000003 HC RX 250 WO HCPCS

## 2022-11-16 PROCEDURE — 70496 CT ANGIOGRAPHY HEAD: CPT

## 2022-11-16 PROCEDURE — 85025 COMPLETE CBC W/AUTO DIFF WBC: CPT

## 2022-11-16 PROCEDURE — 71045 X-RAY EXAM CHEST 1 VIEW: CPT

## 2022-11-16 PROCEDURE — 2500000003 HC RX 250 WO HCPCS: Performed by: EMERGENCY MEDICINE

## 2022-11-16 PROCEDURE — 96375 TX/PRO/DX INJ NEW DRUG ADDON: CPT

## 2022-11-16 PROCEDURE — 6360000004 HC RX CONTRAST MEDICATION: Performed by: RADIOLOGY

## 2022-11-16 PROCEDURE — 96366 THER/PROPH/DIAG IV INF ADDON: CPT

## 2022-11-16 PROCEDURE — 6360000002 HC RX W HCPCS

## 2022-11-16 PROCEDURE — 84484 ASSAY OF TROPONIN QUANT: CPT

## 2022-11-16 PROCEDURE — 99285 EMERGENCY DEPT VISIT HI MDM: CPT

## 2022-11-16 PROCEDURE — 80053 COMPREHEN METABOLIC PANEL: CPT

## 2022-11-16 RX ORDER — ETOMIDATE 2 MG/ML
20 INJECTION INTRAVENOUS ONCE
Status: COMPLETED | OUTPATIENT
Start: 2022-11-16 | End: 2022-11-16

## 2022-11-16 RX ORDER — ONDANSETRON 2 MG/ML
4 INJECTION INTRAMUSCULAR; INTRAVENOUS ONCE
Status: DISCONTINUED | OUTPATIENT
Start: 2022-11-16 | End: 2022-11-16

## 2022-11-16 RX ORDER — VECURONIUM BROMIDE 1 MG/ML
5 INJECTION, POWDER, LYOPHILIZED, FOR SOLUTION INTRAVENOUS ONCE
Status: COMPLETED | OUTPATIENT
Start: 2022-11-16 | End: 2022-11-16

## 2022-11-16 RX ORDER — WATER 1000 ML/1000ML
INJECTION, SOLUTION INTRAVENOUS
Status: DISCONTINUED
Start: 2022-11-16 | End: 2022-11-16 | Stop reason: HOSPADM

## 2022-11-16 RX ORDER — ONDANSETRON 2 MG/ML
4 INJECTION INTRAMUSCULAR; INTRAVENOUS EVERY 6 HOURS PRN
Status: DISCONTINUED | OUTPATIENT
Start: 2022-11-16 | End: 2022-11-16 | Stop reason: HOSPADM

## 2022-11-16 RX ORDER — PROPOFOL 10 MG/ML
5-50 INJECTION, EMULSION INTRAVENOUS ONCE
Status: DISCONTINUED | OUTPATIENT
Start: 2022-11-16 | End: 2022-11-16 | Stop reason: HOSPADM

## 2022-11-16 RX ORDER — PROPOFOL 10 MG/ML
INJECTION, EMULSION INTRAVENOUS
Status: COMPLETED
Start: 2022-11-16 | End: 2022-11-16

## 2022-11-16 RX ORDER — VECURONIUM BROMIDE 1 MG/ML
INJECTION, POWDER, LYOPHILIZED, FOR SOLUTION INTRAVENOUS
Status: COMPLETED
Start: 2022-11-16 | End: 2022-11-16

## 2022-11-16 RX ORDER — SUCCINYLCHOLINE CHLORIDE 20 MG/ML
100 INJECTION INTRAMUSCULAR; INTRAVENOUS ONCE
Status: COMPLETED | OUTPATIENT
Start: 2022-11-16 | End: 2022-11-16

## 2022-11-16 RX ADMIN — SUCCINYLCHOLINE CHLORIDE 100 MG: 20 INJECTION, SOLUTION INTRAMUSCULAR; INTRAVENOUS at 03:52

## 2022-11-16 RX ADMIN — ONDANSETRON 4 MG: 2 INJECTION INTRAMUSCULAR; INTRAVENOUS at 01:24

## 2022-11-16 RX ADMIN — PROPOFOL 20 MCG/KG/MIN: 10 INJECTION, EMULSION INTRAVENOUS at 04:00

## 2022-11-16 RX ADMIN — VECURONIUM BROMIDE 5 MG: 1 INJECTION, POWDER, LYOPHILIZED, FOR SOLUTION INTRAVENOUS at 04:00

## 2022-11-16 RX ADMIN — ETOMIDATE 20 MG: 2 INJECTION, SOLUTION INTRAVENOUS at 03:52

## 2022-11-16 RX ADMIN — VECURONIUM BROMIDE 5 MG: 10 INJECTION, POWDER, FOR SOLUTION INTRAVENOUS at 04:00

## 2022-11-16 RX ADMIN — IOPAMIDOL 60 ML: 755 INJECTION, SOLUTION INTRAVENOUS at 00:43

## 2022-11-16 RX ADMIN — SODIUM CHLORIDE 5 MG/HR: 9 INJECTION, SOLUTION INTRAVENOUS at 01:18

## 2022-11-16 RX ADMIN — VECURONIUM BROMIDE 5 MG: 10 INJECTION, POWDER, FOR SOLUTION INTRAVENOUS at 04:05

## 2022-11-16 ASSESSMENT — PAIN DESCRIPTION - LOCATION: LOCATION: HEAD

## 2022-11-16 ASSESSMENT — PAIN - FUNCTIONAL ASSESSMENT: PAIN_FUNCTIONAL_ASSESSMENT: 0-10

## 2022-11-16 ASSESSMENT — PAIN SCALES - GENERAL: PAINLEVEL_OUTOF10: 9

## 2022-11-16 ASSESSMENT — PAIN DESCRIPTION - DESCRIPTORS: DESCRIPTORS: ACHING

## 2022-11-16 NOTE — ED NOTES
Nurse to nurse given to [facility], [address], RN  11/16/22 3904

## 2022-11-16 NOTE — ED NOTES
PT transferred to [facility] transport team     Kimberlyn Gonzalez RN  11/16/22 4434

## 2022-11-16 NOTE — ED PROVIDER NOTES
Radiology Procedure Waiver   Name: Raleigh Marroquin  : 1960  MRN: [id number]    Date:  22    Time: 12:19 AM EST    Benefits of immediately proceeding with Radiology exam(s) without pre-testing outweigh the risks or are not indicated as specified below and therefore the following is/are being waived:    [] Pregnancy test   [] Patients LMP on-time and regular.   [] Patient had Tubal Ligation or has other Contraception Device. [] Patient  is Menopausal or Premenarcheal.    [] Patient had Full or Partial Hysterectomy. [] Protocol for Iodine allergy    [] MRI Questionnaire     [x] BUN/Creatinine   [x] Patient age w/no hx of renal dysfunction. [] Patient on Dialysis. [] Recent Normal Labs.   Electronically signed by ADARSH Wang CNP on 22 at 12:19 AM EST              ADARSH Wang CNP  22 0020

## 2022-11-16 NOTE — ED PROVIDER NOTES
HPI:  11/16/22, Time: 12:16 AM SHERMAN Edwards is a 63 y.o. male presenting to the ED for headache with nausea vomiting, beginning short time ago. The complaint has been constant, moderate in severity, and worsened by nothing. Patient presenting here because of sudden onset headache that started about 20 to 30 minutes ago. Patient reporting nausea vomiting. .  Patient does report sharp stabbing pain. Patient reporting upper or lower extremity weakness. Patient reporting no fever no chills no cough. Patient reporting no numbness or tingling. Patient reports never having headaches like this in the past.  No history of trauma or injury. Patient does have a history multiple myeloma. ROS:   Pertinent positives and negatives are stated within HPI, all other systems reviewed and are negative.  --------------------------------------------- PAST HISTORY ---------------------------------------------  Past Medical History:  has a past medical history of Compression fracture, History of blood transfusion, Hyperlipidemia, and Multiple myeloma ([facility] [address] Past Surgical History:  has a past surgical history that includes Fixation Kyphoplasty; Tonsillectomy; Appendectomy (2011); other surgical history (12/21/2014); lipoma resection; bronchoscopy (12/1/2014); Abdomen surgery; and fracture surgery. Social History:  reports that he has never smoked. He has never used smokeless tobacco. He reports current alcohol use. He reports that he does not use drugs. Family History: family history includes Cancer in his father. The patients home medications have been reviewed. Allergies: Patient has no known allergies.     ---------------------------------------------------PHYSICAL EXAM--------------------------------------    Constitutional/General: Alert and oriented x3, mild distress  Head: Normocephalic and atraumatic  Eyes: PERRL, EOMI  Mouth: Oropharynx clear, handling secretions, no trismus  Neck: Supple, full ROM, non tender to palpation in the midline, no stridor, no crepitus, no meningeal signs  Pulmonary: Lungs clear to auscultation bilaterally, no wheezes, rales, or rhonchi. Not in respiratory distress  Cardiovascular:  Regular rate. Regular rhythm. No murmurs, gallops, or rubs. 2+ distal pulses  Chest: no chest wall tenderness  Abdomen: Soft. Non tender. Non distended. +BS. No rebound, guarding, or rigidity. No pulsatile masses appreciated. Musculoskeletal: Moves all extremities x 4. Warm and well perfused, no clubbing, cyanosis, or edema. Capillary refill <3 seconds  Skin: warm and dry. No rashes. Neurologic: GCS 15, CN 2-12 grossly intact, no focal deficits, symmetric strength 5/5 in the upper and lower extremities bilaterally  Psych: Normal Affect    -------------------------------------------------- RESULTS -------------------------------------------------  I have personally reviewed all laboratory and imaging results for this patient. Results are listed below.      LABS:  Results for orders placed or performed during the hospital encounter of 11/16/22   CBC with Auto Differential   Result Value Ref Range    WBC 7.5 4.5 - 11.5 E9/L    RBC 4.45 3.80 - 5.80 E12/L    Hemoglobin 13.9 12.5 - 16.5 g/dL    Hematocrit 40.9 37.0 - 54.0 %    MCV 91.9 80.0 - 99.9 fL    MCH 31.2 26.0 - 35.0 pg    MCHC 34.0 32.0 - 34.5 %    RDW 12.6 11.5 - 15.0 fL    Platelets 732 974 - 801 E9/L    MPV 10.9 7.0 - 12.0 fL    Neutrophils % 75.9 43.0 - 80.0 %    Immature Granulocytes % 0.3 0.0 - 5.0 %    Lymphocytes % 15.4 (L) 20.0 - 42.0 %    Monocytes % 7.6 2.0 - 12.0 %    Eosinophils % 0.4 0.0 - 6.0 %    Basophils % 0.4 0.0 - 2.0 %    Neutrophils Absolute 5.69 1.80 - 7.30 E9/L    Immature Granulocytes # 0.02 E9/L    Lymphocytes Absolute 1.15 (L) 1.50 - 4.00 E9/L    Monocytes Absolute 0.57 0.10 - 0.95 E9/L    Eosinophils Absolute 0.03 (L) 0.05 - 0.50 E9/L    Basophils Absolute 0.03 0.00 - 0.20 E9/L   Comprehensive Metabolic Panel   Result Value Ref Range    Sodium 140 132 - 146 mmol/L    Potassium 3.5 3.5 - 5.0 mmol/L    Chloride 100 98 - 107 mmol/L    CO2 25 22 - 29 mmol/L    Anion Gap 15 7 - 16 mmol/L    Glucose 181 (H) 74 - 99 mg/dL    BUN 22 6 - 23 mg/dL    Creatinine 1.2 0.7 - 1.2 mg/dL    Est, Glom Filt Rate >60 >=60 mL/min/1.73    Calcium 9.6 8.6 - 10.2 mg/dL    Total Protein 6.9 6.4 - 8.3 g/dL    Albumin 4.6 3.5 - 5.2 g/dL    Total Bilirubin 0.9 0.0 - 1.2 mg/dL    Alkaline Phosphatase 57 40 - 129 U/L    ALT 22 0 - 40 U/L    AST 23 0 - 39 U/L   Troponin   Result Value Ref Range    Troponin, High Sensitivity 9 0 - 11 ng/L   APTT   Result Value Ref Range    aPTT 25.3 24.5 - 35.1 sec   Protime-INR   Result Value Ref Range    Protime 11.5 9.3 - 12.4 sec    INR 1.1    EKG 12 Lead   Result Value Ref Range    Ventricular Rate 68 BPM    Atrial Rate 68 BPM    P-R Interval 226 ms    QRS Duration 106 ms    Q-T Interval 428 ms    QTc Calculation (Bazett) 455 ms    P Axis 59 degrees    R Axis -57 degrees    T Axis 45 degrees       RADIOLOGY:  Interpreted by Radiologist.  XR CHEST PORTABLE   Final Result   No acute cardiopulmonary abnormality. CTA HEAD W CONTRAST   Final Result   1. Large subarachnoid hemorrhage centered at the level of the   [id number] and basal cisterns. Small acute subdural hematoma along the   right frontal convexity measuring up to 5 mm in thickness. Small   intraventricular hemorrhage with blood products in the bilateral atria of the   lateral ventricles, 3rd, and 4th ventricles. 2. No evidence of large vessel occlusion or hemodynamically significant   stenosis involving the intracranial arteries. No evidence of aneurysm or   vascular malformation. RECOMMENDATIONS:   Critical results were called by Dr. Tracy Swan to Dr Sushila Pagan on 11/16/2022   at 01:16. EKG: This EKG is signed and interpreted by me.     Rate: 68  Rhythm: Sinus  Interpretation: no acute changes  Comparison: no previous EKG available        ------------------------- NURSING NOTES AND VITALS REVIEWED ---------------------------   The nursing notes within the ED encounter and vital signs as below have been reviewed by myself. /72   Pulse (!) 115   Temp 97.5 °F (36.4 °C) (Oral)   Resp (!) 31   Ht 5' 10\" (1.778 m)   Wt 190 lb (86.2 kg)   SpO2 100%   BMI 27.26 kg/m²   Oxygen Saturation Interpretation: Normal    The patients available past medical records and past encounters were reviewed. ------------------------------ ED COURSE/MEDICAL DECISION MAKING----------------------  Medications   iopamidol (ISOVUE-370) 76 % injection 60 mL (60 mLs IntraVENous Given 11/16/22 0043)   propofol 1000 MG/100ML injection (  Stopped 11/16/22 0421)   etomidate (AMIDATE) injection 20 mg (20 mg IntraVENous Given 11/16/22 0352)   succinylcholine (ANECTINE) injection 100 mg (100 mg IntraVENous Given 11/16/22 0352)   vecuronium (NORCURON) injection 5 mg (5 mg IntraVENous Given 11/16/22 0400)   vecuronium (NORCURON) injection 5 mg (5 mg IntraVENous Given 11/16/22 0405)     PROCEDURE  11/16/22       Time: 352 am    INTUBATION  Risks, benefits and alternatives if able (for applicable procedures below) described. Performed By: Stella Santos MD.    Indication:  impending respiratory failure. Informed consent: Verbal consent obtained. The spouse was counseled regarding the procedure in person, it's indications, risks, potential complications and alternatives and any questions were answered. Verbal consent was obtained. Procedure: Following Preoxygenation the patient was pretreated with etomidate followed by succinylcholine. Intubation was performed after single attempt(s) by direct laryngoscopy using a Glidescope and 8.0mm cuffed endotracheal tube was inserted . Initial post procedure placement:  confirmed by bilateral breath sounds, ETCO2 detection, and absence of sounds over stomach. Tube Secured @ 25cm at the Lip.    Post procedure chest x-ray: has been ordered but is still pending. Procedural Complications: None. Anesthesia Consult:  no.               Medical Decision Making:   Arrived here with sudden onset headache with nausea vomiting. Patient reports no headaches like this in the past.  Patient does have history multiple myeloma. Patient arrived here he had labs waived and CT was ordered to rule out acute pathology. CT did show subarachnoid hemorrhage. Patient has no obvious aneurysm. Patient was started on Cardene drip. The emergency room. Patient's blood pressure did improve. Patient remained awake here in the emergency department and able to converse at times he did recognize his family members initially he did start to deteriorate slightly here in the emergency department. Patient became less responsive but was able to move extremities and able to grasp with hands and would open his eyes with sternal rub. Patient CT does show significant subarachnoid hemorrhage as well as small subdural.  There is no signs of aneurysm. .  I did speak to  and he thought patient would be better served to be sent to [facility] as well. There is no obvious aneurysm he could see. Patient family was made aware of plan and agreeable transport to [facility] clinic. There is no availability as far as flying patient [facility]. Patient will be transported by ground. Patient while here in the emergency department did worsen as far as mentation. Patient was having some garbled speech. He was able to follow commands and grasp with his hand patient. He was able to open eyes to deep sternal rub. With patient transport time being over an hour. There was concern that patient would further deteriorate in route to [facility]. Patient was intubated by me. Patient's family were made aware of reason and plan. Re-Evaluations:             Patient rechecked multiple times here in the emergency room. Patient arrived here and he was awake and alert. Patient did undergo CT emergently due to his symptoms. Patient CT was noted and family was made aware of findings. Patient was also made aware of findings of subarachnoid hemorrhage. While here in the emergency room. Patient was able to follow commands but slowly deteriorating and although following commands here in the emergency room and able to open his eyes with sternal rub patient was intubated here due to him being transported by ground and concern for him deteriorating in route. Family made aware of plan. Patient vital signs noted and blood pressure appears to be controlled with Cardene. Consultations:  I spoke to Dr. Scott Bird at [facility]. Patient is excepted there at [facility]. I did speak to  artery radiologist and did look at CT himself there is no obvious aneurysm that he could see but but patient would be better served at [facility] due to finding                 Critical Care:   Please note that the withdrawal or failure to initiate urgent interventions for this patient would likely result in a life threatening deterioration or permanent disability. Accordingly this patient received 45 minutes of critical care time, excluding separately billable procedures. This patient's ED course included: a personal history and physicial eaxmination    This patient has been closely monitored during their ED course. Counseling: The emergency provider has spoken with the patient and discussed todays results, in addition to providing specific details for the plan of care and counseling regarding the diagnosis and prognosis. Questions are answered at this time and they are agreeable with the plan.       --------------------------------- IMPRESSION AND DISPOSITION ---------------------------------    IMPRESSION  1.  Spontaneous subarachnoid hemorrhage ([facility] [address]        DISPOSITION  Disposition: transfer  to [place] clinic  Patient condition is serious        NOTE: This report was transcribed using voice recognition software.  Every effort was made to ensure accuracy; however, inadvertent computerized transcription errors may be present          Vonnie Ayoub MD  11/16/22 Ronaldo Green MD  11/16/22 1643

## 2022-11-16 NOTE — ED NOTES
Labs waived, CT paged to bring pt to scan stat at this time.  Pt to scan with JENNIFER Gongora RN  11/16/22 0028

## 2022-11-16 NOTE — ED NOTES
PT unresponsive w/[facility] transport. ED at [facility]  20mg etomidate and 100mg succs IVP given. 0352 intubated 8.0 26 VERONICA. Etco2 45. BL breath sounds. 0353 ETT pulled back to 25 VERONICA. + breath sounds.      Phoenix Howe RN  11/16/22 1143

## 2023-05-25 LAB
ANION GAP SERPL CALCULATED.3IONS-SCNC: 10 MMOL/L (ref 7–16)
BUN SERPL-MCNC: 18 MG/DL (ref 6–23)
CALCIUM SERPL-MCNC: 9.7 MG/DL (ref 8.6–10.2)
CHLORIDE SERPL-SCNC: 102 MMOL/L (ref 98–107)
CO2 SERPL-SCNC: 27 MMOL/L (ref 22–29)
CREAT SERPL-MCNC: 1 MG/DL (ref 0.7–1.2)
GLUCOSE SERPL-MCNC: 92 MG/DL (ref 74–99)
POTASSIUM SERPL-SCNC: 4.6 MMOL/L (ref 3.5–5)
SODIUM SERPL-SCNC: 139 MMOL/L (ref 132–146)

## 2023-09-02 ENCOUNTER — HOSPITAL ENCOUNTER (OUTPATIENT)
Dept: ULTRASOUND IMAGING | Age: 63
End: 2023-09-02
Payer: COMMERCIAL

## 2023-09-02 ENCOUNTER — HOSPITAL ENCOUNTER (OUTPATIENT)
Age: 63
End: 2023-09-02
Payer: COMMERCIAL

## 2023-09-02 DIAGNOSIS — R52 PAIN: ICD-10-CM

## 2023-09-02 PROCEDURE — 93971 EXTREMITY STUDY: CPT

## 2024-01-03 ENCOUNTER — HOSPITAL ENCOUNTER (OUTPATIENT)
Age: 64
Discharge: HOME | End: 2024-01-05

## 2024-01-07 LAB
SEND OUT REPORT: NORMAL
TEST NAME: NORMAL

## 2024-01-08 LAB — SURGICAL PATHOLOGY REPORT: NORMAL

## 2024-05-14 LAB
PROLACTIN: 13.27 NG/ML
PROSTATE SPECIFIC ANTIGEN: 2.47 NG/ML (ref 0–4)

## 2024-05-16 LAB
SEX HORMONE BINDING GLOBULIN: 27 NMOL/L (ref 19–76)
TESTOSTERONE FREE-NONMALE: 41.9 PG/ML (ref 47–244)
TESTOSTERONE TOTAL: 197 NG/DL (ref 193–740)

## 2025-01-11 ENCOUNTER — HOSPITAL ENCOUNTER (OUTPATIENT)
Age: 65
Discharge: HOME | End: 2025-01-13
Payer: COMMERCIAL

## 2025-01-11 ENCOUNTER — HOSPITAL ENCOUNTER (OUTPATIENT)
Dept: GENERAL RADIOLOGY | Age: 65
Discharge: HOME | End: 2025-01-13
Payer: COMMERCIAL

## 2025-01-11 DIAGNOSIS — R52 PAIN: ICD-10-CM

## 2025-01-11 PROCEDURE — 73562 X-RAY EXAM OF KNEE 3: CPT

## 2025-05-01 LAB — PSA SERPL-MCNC: 1.47 NG/ML (ref 0–4)

## 2025-09-02 LAB
BILIRUB DIRECT SERPL-MCNC: 0.4 MG/DL (ref 0–0.2)
BILIRUB SERPL-MCNC: 1.4 MG/DL (ref 0–1.2)